# Patient Record
Sex: MALE | Race: BLACK OR AFRICAN AMERICAN | NOT HISPANIC OR LATINO | URBAN - METROPOLITAN AREA
[De-identification: names, ages, dates, MRNs, and addresses within clinical notes are randomized per-mention and may not be internally consistent; named-entity substitution may affect disease eponyms.]

---

## 2021-10-26 ENCOUNTER — OUTPATIENT (OUTPATIENT)
Dept: OUTPATIENT SERVICES | Facility: HOSPITAL | Age: 46
LOS: 1 days | End: 2021-10-26

## 2021-10-26 DIAGNOSIS — Z20.822 CONTACT WITH AND (SUSPECTED) EXPOSURE TO COVID-19: ICD-10-CM

## 2021-10-27 LAB — SARS-COV-2 RNA SPEC QL NAA+PROBE: SIGNIFICANT CHANGE UP

## 2021-12-25 ENCOUNTER — EMERGENCY (EMERGENCY)
Facility: HOSPITAL | Age: 46
LOS: 1 days | Discharge: HOME | End: 2021-12-25
Attending: EMERGENCY MEDICINE | Admitting: EMERGENCY MEDICINE
Payer: MEDICAID

## 2021-12-25 VITALS
SYSTOLIC BLOOD PRESSURE: 136 MMHG | DIASTOLIC BLOOD PRESSURE: 74 MMHG | OXYGEN SATURATION: 99 % | RESPIRATION RATE: 18 BRPM | TEMPERATURE: 97 F | HEART RATE: 49 BPM

## 2021-12-25 VITALS
SYSTOLIC BLOOD PRESSURE: 125 MMHG | OXYGEN SATURATION: 100 % | DIASTOLIC BLOOD PRESSURE: 77 MMHG | RESPIRATION RATE: 20 BRPM | TEMPERATURE: 99 F | HEART RATE: 56 BPM

## 2021-12-25 LAB
ALBUMIN SERPL ELPH-MCNC: 4.6 G/DL — SIGNIFICANT CHANGE UP (ref 3.3–5)
ALP SERPL-CCNC: 56 U/L — SIGNIFICANT CHANGE UP (ref 40–120)
ALT FLD-CCNC: 12 U/L — SIGNIFICANT CHANGE UP (ref 4–41)
ANION GAP SERPL CALC-SCNC: 13 MMOL/L — SIGNIFICANT CHANGE UP (ref 7–14)
AST SERPL-CCNC: 22 U/L — SIGNIFICANT CHANGE UP (ref 4–40)
BASOPHILS # BLD AUTO: 0.03 K/UL — SIGNIFICANT CHANGE UP (ref 0–0.2)
BASOPHILS NFR BLD AUTO: 0.4 % — SIGNIFICANT CHANGE UP (ref 0–2)
BILIRUB SERPL-MCNC: 0.3 MG/DL — SIGNIFICANT CHANGE UP (ref 0.2–1.2)
BUN SERPL-MCNC: 12 MG/DL — SIGNIFICANT CHANGE UP (ref 7–23)
CALCIUM SERPL-MCNC: 9.4 MG/DL — SIGNIFICANT CHANGE UP (ref 8.4–10.5)
CHLORIDE SERPL-SCNC: 100 MMOL/L — SIGNIFICANT CHANGE UP (ref 98–107)
CO2 SERPL-SCNC: 25 MMOL/L — SIGNIFICANT CHANGE UP (ref 22–31)
CREAT SERPL-MCNC: 1.34 MG/DL — HIGH (ref 0.5–1.3)
EOSINOPHIL # BLD AUTO: 0.01 K/UL — SIGNIFICANT CHANGE UP (ref 0–0.5)
EOSINOPHIL NFR BLD AUTO: 0.1 % — SIGNIFICANT CHANGE UP (ref 0–6)
GLUCOSE SERPL-MCNC: 137 MG/DL — HIGH (ref 70–99)
HCT VFR BLD CALC: 41.5 % — SIGNIFICANT CHANGE UP (ref 39–50)
HGB BLD-MCNC: 12.8 G/DL — LOW (ref 13–17)
IANC: 4.96 K/UL — SIGNIFICANT CHANGE UP (ref 1.5–8.5)
IMM GRANULOCYTES NFR BLD AUTO: 0.1 % — SIGNIFICANT CHANGE UP (ref 0–1.5)
LIDOCAIN IGE QN: 44 U/L — SIGNIFICANT CHANGE UP (ref 7–60)
LYMPHOCYTES # BLD AUTO: 1.62 K/UL — SIGNIFICANT CHANGE UP (ref 1–3.3)
LYMPHOCYTES # BLD AUTO: 23.4 % — SIGNIFICANT CHANGE UP (ref 13–44)
MCHC RBC-ENTMCNC: 27.2 PG — SIGNIFICANT CHANGE UP (ref 27–34)
MCHC RBC-ENTMCNC: 30.8 GM/DL — LOW (ref 32–36)
MCV RBC AUTO: 88.1 FL — SIGNIFICANT CHANGE UP (ref 80–100)
MONOCYTES # BLD AUTO: 0.29 K/UL — SIGNIFICANT CHANGE UP (ref 0–0.9)
MONOCYTES NFR BLD AUTO: 4.2 % — SIGNIFICANT CHANGE UP (ref 2–14)
NEUTROPHILS # BLD AUTO: 4.96 K/UL — SIGNIFICANT CHANGE UP (ref 1.8–7.4)
NEUTROPHILS NFR BLD AUTO: 71.8 % — SIGNIFICANT CHANGE UP (ref 43–77)
NRBC # BLD: 0 /100 WBCS — SIGNIFICANT CHANGE UP
NRBC # FLD: 0 K/UL — SIGNIFICANT CHANGE UP
PLATELET # BLD AUTO: 246 K/UL — SIGNIFICANT CHANGE UP (ref 150–400)
POTASSIUM SERPL-MCNC: 3.9 MMOL/L — SIGNIFICANT CHANGE UP (ref 3.5–5.3)
POTASSIUM SERPL-SCNC: 3.9 MMOL/L — SIGNIFICANT CHANGE UP (ref 3.5–5.3)
PROT SERPL-MCNC: 7 G/DL — SIGNIFICANT CHANGE UP (ref 6–8.3)
RBC # BLD: 4.71 M/UL — SIGNIFICANT CHANGE UP (ref 4.2–5.8)
RBC # FLD: 13.3 % — SIGNIFICANT CHANGE UP (ref 10.3–14.5)
SODIUM SERPL-SCNC: 138 MMOL/L — SIGNIFICANT CHANGE UP (ref 135–145)
WBC # BLD: 6.92 K/UL — SIGNIFICANT CHANGE UP (ref 3.8–10.5)
WBC # FLD AUTO: 6.92 K/UL — SIGNIFICANT CHANGE UP (ref 3.8–10.5)

## 2021-12-25 PROCEDURE — 99053 MED SERV 10PM-8AM 24 HR FAC: CPT

## 2021-12-25 PROCEDURE — 99284 EMERGENCY DEPT VISIT MOD MDM: CPT

## 2021-12-25 RX ORDER — SODIUM CHLORIDE 9 MG/ML
1000 INJECTION INTRAMUSCULAR; INTRAVENOUS; SUBCUTANEOUS ONCE
Refills: 0 | Status: COMPLETED | OUTPATIENT
Start: 2021-12-25 | End: 2021-12-25

## 2021-12-25 RX ORDER — HALOPERIDOL DECANOATE 100 MG/ML
5 INJECTION INTRAMUSCULAR ONCE
Refills: 0 | Status: COMPLETED | OUTPATIENT
Start: 2021-12-25 | End: 2021-12-25

## 2021-12-25 RX ORDER — MORPHINE SULFATE 50 MG/1
4 CAPSULE, EXTENDED RELEASE ORAL ONCE
Refills: 0 | Status: DISCONTINUED | OUTPATIENT
Start: 2021-12-25 | End: 2021-12-25

## 2021-12-25 RX ORDER — ONDANSETRON 8 MG/1
4 TABLET, FILM COATED ORAL ONCE
Refills: 0 | Status: COMPLETED | OUTPATIENT
Start: 2021-12-25 | End: 2021-12-25

## 2021-12-25 RX ADMIN — HALOPERIDOL DECANOATE 5 MILLIGRAM(S): 100 INJECTION INTRAMUSCULAR at 02:01

## 2021-12-25 RX ADMIN — ONDANSETRON 4 MILLIGRAM(S): 8 TABLET, FILM COATED ORAL at 02:01

## 2021-12-25 RX ADMIN — SODIUM CHLORIDE 1000 MILLILITER(S): 9 INJECTION INTRAMUSCULAR; INTRAVENOUS; SUBCUTANEOUS at 02:01

## 2021-12-25 NOTE — ED PROVIDER NOTE - OBJECTIVE STATEMENT
47 yo with no reported PMH presenting with one day of severe non radiating sharp epigastric abdominal pain.  Is associated with multiple episodes of NBNB emesis.  Denies fever and no CP or urinary complaints.  no fever.

## 2021-12-25 NOTE — ED PROVIDER NOTE - PATIENT PORTAL LINK FT
You can access the FollowMyHealth Patient Portal offered by [facility] by registering at the following website: [email or web address]. By joining [employer]’s FollowMyHealth portal, you will also be able to view your health information using other applications (apps) compatible with our system.

## 2021-12-25 NOTE — ED ADULT TRIAGE NOTE - CHIEF COMPLAINT QUOTE
Pt arrives looking very uncomfortable. c/o pain to abdomen and epigastric area. Vomiting in triage. Sweating profusely. Says he is weak. Not giving much information. Denies drug or alcohol use. EKG obtained.

## 2021-12-25 NOTE — ED PROVIDER NOTE - NSFOLLOWUPINSTRUCTIONS_ED_ALL_ED_FT
You were seen in the Emergency Department for: nausea, vomiting, abdominal pain    For pain/fever, you may take Tylenol (acetaminophen) 650 mg every 6 hours, OR Advil (ibuprofen) 600 mg every 8 hours.    Please follow up with your primary physician as discussed. If you do not have a primary physician or specialist of your needs, please call [phone] to find one convenient for you. At this number you will be able to locate a provider who accepts your insurance, as well as locate the right specialist for your needs.    You should return to the Emergency Department if you feel any new/worsening/persistent symptoms including but not limited to: chest pain, difficulty breathing, loss of consciousness, bleeding, uncontrolled pain, numbness/weakness of a body part

## 2021-12-25 NOTE — ED PROVIDER NOTE - PHYSICAL EXAMINATION
Gen: Well appearing in NAD  Head: NC/AT  Neck: trachea midline  Resp:  No distress  Abd: soft epigastric TTP no rebound or guarding  Ext: no deformities  Neuro:  A&O appears non focal  Skin:  Warm and dry as visualized  Psych:  Normal affect and mood

## 2021-12-25 NOTE — ED PROVIDER NOTE - PROGRESS NOTE DETAILS
Carlos PGY-2: Patient reassessed, resting comfortably, able to PO ginger ale. Discussed negative labs. Educated patient on likelihood of cannabis hyperemsis syndrome and that only cessation of marijuana use will stop symptoms. Patient expresses understanding. Agreeable to dc with pmd f/u. Return precautions given.

## 2021-12-25 NOTE — ED PROVIDER NOTE - CLINICAL SUMMARY MEDICAL DECISION MAKING FREE TEXT BOX
45 yo with epigastric abd pain and NV.  Does report frequent MJ use.  Could be hyperemesis syndrome from MJ.  Also need to consider pancreatitis or GB pathology.  Will get labs and treat with haldol and antiemetics to see if there is improvement.  Based on results and clinical response will consider adding imaging.

## 2021-12-25 NOTE — ED ADULT NURSE NOTE - OBJECTIVE STATEMENT
break coverage RN - received patient in room 16 c/o abdominal pain. no reported PMH presenting with one day of severe non radiating sharp epigastric abdominal pain. associated with multiple episodes of NBNB emesis.  Denies fever and no CP or urinary complaints.  no fever. resp even and unlabored. pt minimally participative in assessment and examination. 20G IV placed to L AC. labs drawn & sent. Will continue to monitor.

## 2024-05-08 ENCOUNTER — EMERGENCY (EMERGENCY)
Facility: HOSPITAL | Age: 49
LOS: 1 days | Discharge: HOME | End: 2024-05-08
Admitting: EMERGENCY MEDICINE
Payer: MEDICAID

## 2024-05-08 VITALS
HEART RATE: 58 BPM | DIASTOLIC BLOOD PRESSURE: 74 MMHG | TEMPERATURE: 98 F | RESPIRATION RATE: 16 BRPM | SYSTOLIC BLOOD PRESSURE: 118 MMHG | OXYGEN SATURATION: 99 %

## 2024-05-08 PROCEDURE — 99284 EMERGENCY DEPT VISIT MOD MDM: CPT

## 2024-05-08 NOTE — ED ADULT TRIAGE NOTE - CHIEF COMPLAINT QUOTE
Pt c/o rash to bilateral arms. No new detergents or soaps. No known allergies. +itchiness to arms. No other symptoms. Denies PMHx

## 2024-05-09 PROBLEM — Z78.9 OTHER SPECIFIED HEALTH STATUS: Chronic | Status: ACTIVE | Noted: 2021-12-25

## 2024-05-09 RX ORDER — HYDROCORTISONE 1 %
1 OINTMENT (GRAM) TOPICAL
Refills: 0 | Status: DISCONTINUED | OUTPATIENT
Start: 2024-05-09 | End: 2024-05-12

## 2024-05-09 RX ADMIN — Medication 1 APPLICATION(S): at 01:38

## 2024-05-09 NOTE — ED PROVIDER NOTE - OBJECTIVE STATEMENT
48-year-old male no reported past medical history presents with papular rash to bilateral arms after washing his truck 2 weeks ago.  Patient reported small bumps on hands and arms since then.  Patient denies any new lesions.  Lesions appear to be dried over at this point.  Patient reports intermittent itchiness.  No fevers chills.  No rash to palms or soles of feet.  No rash in oral mucosa.  No other complaints.

## 2024-05-09 NOTE — ED PROVIDER NOTE - NSFOLLOWUPCLINICS_GEN_ALL_ED_FT
[facility] - [place]  Dermatology  [address], [address]  [place] [address]  Phone: [phone]  Fax: [phone]

## 2024-05-09 NOTE — ED PROVIDER NOTE - CLINICAL SUMMARY MEDICAL DECISION MAKING FREE TEXT BOX
48-year-old male no reported past medical history presents with papular rash to bilateral arms after washing his truck 2 weeks ago.  Patient reported small bumps on hands and arms since then.  Patient denies any new lesions.  Lesions appear to be dried over at this point.  Patient reports intermittent itchiness.  No fevers chills.  No rash to palms or soles of feet.  No rash in oral mucosa.  No other complaints.    plan  - topical steroid cream  - follow up with derm

## 2024-05-09 NOTE — ED PROVIDER NOTE - PATIENT PORTAL LINK FT
You can access the FollowMyHealth Patient Portal offered by [facility] by registering at the following website: [email or web address]. By joining [employer]’s FollowMyHealth portal, you will also be able to view your health information using other applications (apps) compatible with our system.

## 2024-12-03 NOTE — ED ADULT NURSE NOTE - NSICDXPASTSURGICALHX_GEN_ALL_CORE_FT
Contacted patient and informed him that [employer] was extended. Patient voices understanding and states that there is no longer a need for letter as [employer] already informed patient that they had received [employer] and time was extended.    Additionally clarified that patient does not have an additional short term disability claim through [employer]. Patient was stating that [place] did not receive any billing paperwork. Consequently provided phone number to billing department.  
[employer] completed and sent.  
Patient would like to know if Dr Talavera can changed  return to work date from 12/2/24 to 12/23/24 Monday, due to he start PT on 12/2/24 and that his knee is still sore.     Please call patient once return to work letter is completed.    Also stated that his insurance was bill for surgery instead of workers comp stated worker ifeanyi [employer] didn't get paperwork       
PAST SURGICAL HISTORY:  No significant past surgical history

## 2025-01-13 NOTE — ED ADULT TRIAGE NOTE - AS PAIN REST
Writer notified [facility]  to find children, Siblings.  Here is what he found.       Siblings     Loyda Webber (David) - [phone] - Loyda is looking for different telephone numbers for Patients 2 sons.  His other son's name is Emmanuel Hernandez. ,Loyda advised she would try to reach out to them on facebook to call the Hospital.     Arcelia Powell (Sister) - [phone]     Divya White (Sister) - [phone] ,  [phone] - had to leave a voice mail message to call writer back.      Oliver Roper - [phone]     Albert Hernandez (Brother) - [phone]     Abdulaziz Hernandez (Brother) - [phone]-  Abdulaziz answered the home, he does not have Alexmar telephone number. To try calling Pao, but he works,  and has 3 children.  Writer left a voice mail message for him to call writer back.     Children      Ricardo Whitley (Son) - [phone]- . Writer tried calling Ricardo Zuleta, and had to leave a voice mail message.     Electronically signed by Meron Holguin RN on 1/13/2025 at 1:42 PM     Writedanilo received a call from other son Emmanuel [phone], he advised he has been trying to guardianship on his father fro the lastg 3 years and not sure how to go about it.  He says his father is homeless and needs help. He will be up to the hospital tomorrow morning at 8:45a.m. to talk with physicians.    Electronically signed by Meron Holguin RN on 1/13/2025 at 2:00 PM    0 (no pain/absence of nonverbal indicators of pain)

## 2025-02-04 NOTE — ED ADULT TRIAGE NOTE - MODE OF ARRIVAL
Subjective:   Patient ID: Pranav Vargas is a 41 year old male.    HPI  Pranav Vargas is a 41 year old male who presents for a complete physical exam.   HPI:   Pt reports normal state of health  Denies cp or cortez    Chart reviewed with pt as he is new to me    PAST MEDICAL, SOCIAL, FAMILY HISTORIES REVIEWED WITH PT  .    Immunization History   Administered Date(s) Administered    Covid-19 Vaccine Moderna 100 mcg/0.5 ml 03/15/2021, 04/12/2021     Wt Readings from Last 6 Encounters:   03/04/20 170 lb (77.1 kg)   02/10/20 170 lb (77.1 kg)     There is no height or weight on file to calculate BMI.     Lab Results   Component Value Date    GLU 99 02/13/2020     Lab Results   Component Value Date    CHOLEST 177 02/13/2020     Lab Results   Component Value Date    HDL 76 (H) 02/13/2020     Lab Results   Component Value Date    LDL 92 02/13/2020     Lab Results   Component Value Date    AST 22 02/13/2020     Lab Results   Component Value Date    ALT 37 02/13/2020     No results found for: \"PSA\"     Current Outpatient Medications   Medication Sig Dispense Refill    SUMAtriptan Succinate 50 MG Oral Tab Use at onset; repeat once after 2 hours-ONLY 2 IN 24 HR MAX. This is a 30 day supply. 9 tablet 0      Past Medical History:    Migraines      No past surgical history on file.   Family History   Problem Relation Age of Onset    Cancer Father 56        Rectal    Heart Disease Mother     Heart Disease Paternal Grandmother     Cancer Paternal Grandfather 60        brain cancer    Heart Disease Maternal Grandfather       Social History:  Social History     Socioeconomic History    Marital status: Single   Tobacco Use    Smoking status: Never    Smokeless tobacco: Never   Vaping Use    Vaping status: Never Used   Substance and Sexual Activity    Alcohol use: Yes     Alcohol/week: 6.0 standard drinks of alcohol     Types: 6 Standard drinks or equivalent per week    Drug use: Not Currently   Other Topics Concern    Caffeine Concern  Walk in Yes     Comment: 1 cup a day    Exercise Yes     Comment: 5 x weeks      Occ: yes. : yes. Children: yes.   Exercise: once per week,  twice per week.  Diet: watches fats closely and watches sugar closely     REVIEW OF SYSTEMS:   A comprehensive 10 point review of systems was completed.     Pertinent positives and negatives noted in the HPI.      EXAM:   There were no vitals taken for this visit.  There is no height or weight on file to calculate BMI.   GENERAL: well developed, well nourished,in no apparent distress  SKIN: no rashes,no suspicious lesions  HEENT: atraumatic, normocephalic,ears and throat are clear  EYES:PERRLA, EOMI,conjunctiva are clear  NECK: supple,no adenopathy,no bruits  LUNGS: clear to auscultation  CARDIO: RRR without murmur  GI: good BS's,no masses, HSM or tenderness  : deferred  MUSCULOSKELETAL: back is not tender  EXTREMITIES: no cyanosis, clubbing or edema  NEURO: Oriented times three,motor and sensory are grossly intact    ASSESSMENT AND PLAN:   Pranav Vargas is a 41 year old male who presents for a complete physical exam.  There is no height or weight on file to calculate BMI., recommended low fat diet and aerobic exercise 30 minutes three times weekly.     Health maintenance, will check fasting Lipids, CMP, CBC     Migraines are stable and not increasing in freq     Pt info handouts given for: exercise, low fat diet,      The patient indicates understanding of these issues and agrees to the plan.  The patient is asked to return for CPX in 12 m.    History/Other:   Review of Systems  Current Outpatient Medications   Medication Sig Dispense Refill    SUMAtriptan Succinate 50 MG Oral Tab Use at onset; repeat once after 2 hours-ONLY 2 IN 24 HR MAX. This is a 30 day supply. 9 tablet 0     Allergies:Allergies[1]    Objective:   Physical Exam    Assessment & Plan:   1. Annual physical exam    2. Routine general medical examination at a health care facility        No orders of the  defined types were placed in this encounter.      Meds This Visit:  Requested Prescriptions      No prescriptions requested or ordered in this encounter       Imaging & Referrals:  None         [1] No Known Allergies     Private Auto